# Patient Record
Sex: MALE | Race: WHITE | NOT HISPANIC OR LATINO | Employment: FULL TIME | URBAN - METROPOLITAN AREA
[De-identification: names, ages, dates, MRNs, and addresses within clinical notes are randomized per-mention and may not be internally consistent; named-entity substitution may affect disease eponyms.]

---

## 2022-12-27 ENCOUNTER — OFFICE VISIT (OUTPATIENT)
Dept: URGENT CARE | Facility: URGENT CARE | Age: 22
End: 2022-12-27
Payer: COMMERCIAL

## 2022-12-27 VITALS
SYSTOLIC BLOOD PRESSURE: 119 MMHG | TEMPERATURE: 97.7 F | WEIGHT: 145 LBS | HEIGHT: 70 IN | BODY MASS INDEX: 20.76 KG/M2 | DIASTOLIC BLOOD PRESSURE: 67 MMHG | HEART RATE: 57 BPM | OXYGEN SATURATION: 99 %

## 2022-12-27 DIAGNOSIS — Z23 NEED FOR PROPHYLACTIC VACCINATION AND INOCULATION AGAINST INFLUENZA: ICD-10-CM

## 2022-12-27 DIAGNOSIS — S61.411A LACERATION OF RIGHT HAND WITHOUT FOREIGN BODY, INITIAL ENCOUNTER: Primary | ICD-10-CM

## 2022-12-27 PROCEDURE — 12002 RPR S/N/AX/GEN/TRNK2.6-7.5CM: CPT | Performed by: NURSE PRACTITIONER

## 2022-12-27 PROCEDURE — 90715 TDAP VACCINE 7 YRS/> IM: CPT | Performed by: NURSE PRACTITIONER

## 2022-12-27 PROCEDURE — 90686 IIV4 VACC NO PRSV 0.5 ML IM: CPT | Performed by: NURSE PRACTITIONER

## 2022-12-27 PROCEDURE — 90472 IMMUNIZATION ADMIN EACH ADD: CPT | Performed by: NURSE PRACTITIONER

## 2022-12-27 PROCEDURE — 90471 IMMUNIZATION ADMIN: CPT | Performed by: NURSE PRACTITIONER

## 2022-12-27 NOTE — PROGRESS NOTES
"SUBJECTIVE:     Chief Complaint   Patient presents with     Urgent Care     About an hour and a half ago Pt was using a  and slashed lt palm, about 2 inches      Imm/Inj     Flu Shot     Martín Lang is a 22 year old male who presents to the clinic with a laceration on the right palm that occurred 2 hours ago.  This is a non-work related injury.    Mechanism of injury:  at home    Associated symptoms: Denies numbness, weakness, or loss of function  Last tetanus booster within 10 years: no 06/22/2010    EXAM:   The patient appears today in alert, in mild distress during procedure, faint pale shivering clammy presyncope, water ice crackers helped  VITALS: /67 (BP Location: Right arm, Patient Position: Sitting, Cuff Size: Adult Regular)   Pulse 57   Temp 97.7  F (36.5  C) (Temporal)   Ht 1.778 m (5' 10\")   Wt 65.8 kg (145 lb)   SpO2 99%   BMI 20.81 kg/m      Size of laceration: 4 centimeters  Characteristics of the laceration: bleeding- moderate, clean and straight  Tendon function intact: yes  Sensation to light touch intact: yes  Pulses intact: yes    Assessment:     Laceration of right hand without foreign body, initial encounter  Need for prophylactic vaccination and inoculation against influenza    PLAN:  PROCEDURE NOTE:  Wound was locally injected with 2 cc's of Lidocaine 2% plain  Wound cleaned with HIBICLENS  Wound cleaned with sterile water  Wound soaked  Laceration was closed using 5 4-0 interrupted sutures    After care instructions:  Keep wound dressing clean dry intact for 48 hours  After that cleanse with lukewarm soapy water gently  Occasional bacitracin zinc and bandage daily  Suture removal in about 10 to 14 days  Watch for signs of infection pus redness warmth swelling  Tdap updated today, good for 10 years  "

## 2022-12-27 NOTE — PATIENT INSTRUCTIONS
Keep wound dressing clean dry intact for 48 hours  After that cleanse with lukewarm soapy water gently  Occasional bacitracin zinc and bandage daily  Suture removal in about 10 to 14 days  Watch for signs of infection pus redness warmth swelling  Tdap updated today, good for 10 years